# Patient Record
Sex: FEMALE | Race: BLACK OR AFRICAN AMERICAN | NOT HISPANIC OR LATINO | Employment: OTHER | URBAN - METROPOLITAN AREA
[De-identification: names, ages, dates, MRNs, and addresses within clinical notes are randomized per-mention and may not be internally consistent; named-entity substitution may affect disease eponyms.]

---

## 2023-06-17 LAB
CHLAMYDIA TRACH., AMPLIFIED: NEGATIVE
CLUE CELLS: ABNORMAL
N. GONORRHEA, AMPLIFIED: NEGATIVE
NUGENT SCORE: 4
TRICHOMONAS VAGINALIS: NEGATIVE
VAGINITIS-BV + YEAST INTERPRETATION: ABNORMAL
YEAST: ABNORMAL

## 2024-04-19 PROBLEM — N63.0 MASS OF BREAST: Status: ACTIVE | Noted: 2024-03-13

## 2024-04-19 PROBLEM — L81.0 POSTINFLAMMATORY HYPERPIGMENTATION: Status: ACTIVE | Noted: 2018-12-03

## 2024-04-19 PROBLEM — R11.0 NAUSEA: Status: ACTIVE | Noted: 2024-04-19

## 2024-04-19 PROBLEM — N89.8 VAGINAL DISCHARGE: Status: RESOLVED | Noted: 2024-04-19 | Resolved: 2024-04-19

## 2024-04-19 PROBLEM — R21 RASH: Status: ACTIVE | Noted: 2024-04-19

## 2024-04-19 PROBLEM — N76.0 BACTERIAL VAGINOSIS: Status: RESOLVED | Noted: 2023-01-06 | Resolved: 2024-04-19

## 2024-04-19 PROBLEM — F25.0 SCHIZOAFFECTIVE DISORDER, BIPOLAR TYPE (MULTI): Status: ACTIVE | Noted: 2021-06-02

## 2024-04-19 PROBLEM — L81.9 DISORDER OF PIGMENTATION, UNSPECIFIED: Status: ACTIVE | Noted: 2018-12-03

## 2024-04-19 PROBLEM — M15.9 OSTEOARTHRITIS OF MULTIPLE JOINTS: Status: ACTIVE | Noted: 2017-06-28

## 2024-04-19 PROBLEM — F17.200 SMOKER: Status: ACTIVE | Noted: 2017-07-18

## 2024-04-19 PROBLEM — M54.9 BACKACHE: Status: ACTIVE | Noted: 2024-04-19

## 2024-04-19 PROBLEM — M47.812 CERVICAL SPONDYLOSIS WITHOUT MYELOPATHY: Status: ACTIVE | Noted: 2019-11-27

## 2024-04-19 PROBLEM — M51.36 DDD (DEGENERATIVE DISC DISEASE), LUMBAR: Status: ACTIVE | Noted: 2017-10-02

## 2024-04-19 PROBLEM — M54.16 LUMBAR RADICULAR PAIN: Status: ACTIVE | Noted: 2024-04-19

## 2024-04-19 PROBLEM — M54.41 CHRONIC RIGHT-SIDED LOW BACK PAIN WITH RIGHT-SIDED SCIATICA: Status: ACTIVE | Noted: 2017-10-16

## 2024-04-19 PROBLEM — M51.369 DDD (DEGENERATIVE DISC DISEASE), LUMBAR: Status: ACTIVE | Noted: 2017-10-02

## 2024-04-19 PROBLEM — N94.6 MENSTRUAL CRAMP: Status: ACTIVE | Noted: 2024-04-19

## 2024-04-19 PROBLEM — B36.0 PITYRIASIS VERSICOLOR: Status: ACTIVE | Noted: 2018-12-03

## 2024-04-19 PROBLEM — Z86.69 HISTORY OF IMPACTED CERUMEN: Status: ACTIVE | Noted: 2024-04-19

## 2024-04-19 PROBLEM — M16.10 ARTHRITIS OF HIP: Status: ACTIVE | Noted: 2024-03-13

## 2024-04-19 PROBLEM — E55.9 VITAMIN D DEFICIENCY: Status: ACTIVE | Noted: 2021-09-13

## 2024-04-19 PROBLEM — I10 ESSENTIAL HYPERTENSION: Status: ACTIVE | Noted: 2018-12-14

## 2024-04-19 PROBLEM — M47.817 LUMBOSACRAL SPONDYLOSIS WITHOUT MYELOPATHY: Status: ACTIVE | Noted: 2019-11-27

## 2024-04-19 PROBLEM — F17.200 TOBACCO USE DISORDER: Status: ACTIVE | Noted: 2017-07-18

## 2024-04-19 PROBLEM — R63.0 APPETITE LOSS: Status: ACTIVE | Noted: 2024-04-19

## 2024-04-19 PROBLEM — J45.901 SEVERE ASTHMA WITH ACUTE EXACERBATION (HHS-HCC): Status: ACTIVE | Noted: 2024-04-19

## 2024-04-19 PROBLEM — K21.9 GASTROESOPHAGEAL REFLUX DISEASE: Status: ACTIVE | Noted: 2024-03-13

## 2024-04-19 PROBLEM — M79.2 NEURITIS: Status: ACTIVE | Noted: 2017-06-28

## 2024-04-19 PROBLEM — G89.29 CHRONIC RIGHT-SIDED LOW BACK PAIN WITH RIGHT-SIDED SCIATICA: Status: ACTIVE | Noted: 2017-10-16

## 2024-04-19 PROBLEM — F32.2 MAJOR DEPRESSIVE DISORDER, SINGLE EPISODE, SEVERE (MULTI): Chronic | Status: ACTIVE | Noted: 2020-12-18

## 2024-04-19 PROBLEM — M72.2 PLANTAR FASCIITIS, BILATERAL: Status: ACTIVE | Noted: 2017-06-28

## 2024-04-19 PROBLEM — M81.0 OSTEOPOROSIS: Status: ACTIVE | Noted: 2021-07-30

## 2024-04-19 PROBLEM — E78.5 HYPERLIPIDEMIA: Status: ACTIVE | Noted: 2023-12-04

## 2024-04-19 PROBLEM — J41.8 MIXED SIMPLE AND MUCOPURULENT CHRONIC BRONCHITIS (MULTI): Status: ACTIVE | Noted: 2017-11-22

## 2024-04-19 PROBLEM — Q78.2 OSTEOPETROSIS (HHS-HCC): Status: ACTIVE | Noted: 2024-03-13

## 2024-04-19 PROBLEM — R77.2: Status: ACTIVE | Noted: 2024-03-13

## 2024-04-19 PROBLEM — F43.10 PTSD (POST-TRAUMATIC STRESS DISORDER): Status: ACTIVE | Noted: 2023-01-06

## 2024-04-19 PROBLEM — B96.89 BACTERIAL VAGINOSIS: Status: RESOLVED | Noted: 2023-01-06 | Resolved: 2024-04-19

## 2024-04-19 PROBLEM — R06.00 DYSPNEA: Status: ACTIVE | Noted: 2024-04-19

## 2024-04-19 RX ORDER — ERGOCALCIFEROL 1.25 MG/1
1 CAPSULE ORAL
COMMUNITY
Start: 2020-01-28

## 2024-04-19 RX ORDER — ALBUTEROL SULFATE 90 UG/1
2 AEROSOL, METERED RESPIRATORY (INHALATION) EVERY 4 HOURS PRN
COMMUNITY
Start: 2023-12-04

## 2024-04-19 RX ORDER — TIOTROPIUM BROMIDE INHALATION SPRAY 1.56 UG/1
SPRAY, METERED RESPIRATORY (INHALATION)
COMMUNITY
Start: 2023-12-04

## 2024-04-19 RX ORDER — BUDESONIDE AND FORMOTEROL FUMARATE DIHYDRATE 80; 4.5 UG/1; UG/1
2 AEROSOL RESPIRATORY (INHALATION) 2 TIMES DAILY
COMMUNITY
Start: 2023-05-15

## 2024-04-19 RX ORDER — HYDROCHLOROTHIAZIDE 25 MG/1
25 TABLET ORAL
COMMUNITY
Start: 2023-12-04

## 2024-04-19 RX ORDER — DEXTROMETHORPHAN HYDROBROMIDE, GUAIFENESIN 5; 100 MG/5ML; MG/5ML
1300 LIQUID ORAL EVERY 8 HOURS PRN
COMMUNITY
Start: 2023-12-04

## 2024-04-19 RX ORDER — ATORVASTATIN CALCIUM 40 MG/1
40 TABLET, FILM COATED ORAL
COMMUNITY
Start: 2023-06-12

## 2024-04-19 RX ORDER — IBUPROFEN 200 MG
1 TABLET ORAL
COMMUNITY
Start: 2024-04-09

## 2024-04-19 RX ORDER — OLOPATADINE HYDROCHLORIDE 2 MG/ML
1 SOLUTION/ DROPS OPHTHALMIC
COMMUNITY
Start: 2023-04-10

## 2024-04-19 RX ORDER — CETIRIZINE HYDROCHLORIDE 10 MG/1
10 TABLET ORAL
COMMUNITY
Start: 2024-03-25 | End: 2024-06-23

## 2024-04-19 RX ORDER — NAPROXEN 500 MG/1
1 TABLET ORAL
COMMUNITY
Start: 2024-03-13

## 2024-04-19 RX ORDER — AMLODIPINE BESYLATE 10 MG/1
1 TABLET ORAL
COMMUNITY
Start: 2023-06-12 | End: 2024-07-17

## 2024-04-22 ENCOUNTER — OFFICE VISIT (OUTPATIENT)
Dept: ORTHOPEDIC SURGERY | Facility: CLINIC | Age: 64
End: 2024-04-22
Payer: MEDICAID

## 2024-04-22 ENCOUNTER — HOSPITAL ENCOUNTER (OUTPATIENT)
Dept: RADIOLOGY | Facility: CLINIC | Age: 64
Discharge: HOME | End: 2024-04-22
Payer: MEDICAID

## 2024-04-22 DIAGNOSIS — M25.539 WRIST PAIN, ACUTE, UNSPECIFIED LATERALITY: ICD-10-CM

## 2024-04-22 DIAGNOSIS — M25.551 BILATERAL HIP PAIN: ICD-10-CM

## 2024-04-22 DIAGNOSIS — M54.16 BILATERAL LUMBAR RADICULOPATHY: Primary | ICD-10-CM

## 2024-04-22 DIAGNOSIS — M25.552 BILATERAL HIP PAIN: ICD-10-CM

## 2024-04-22 PROCEDURE — 73521 X-RAY EXAM HIPS BI 2 VIEWS: CPT

## 2024-04-22 PROCEDURE — 99204 OFFICE O/P NEW MOD 45 MIN: CPT | Performed by: ORTHOPAEDIC SURGERY

## 2024-04-22 PROCEDURE — 73110 X-RAY EXAM OF WRIST: CPT | Mod: LT

## 2024-04-22 PROCEDURE — 73110 X-RAY EXAM OF WRIST: CPT | Mod: LEFT SIDE | Performed by: RADIOLOGY

## 2024-04-22 PROCEDURE — 73521 X-RAY EXAM HIPS BI 2 VIEWS: CPT | Mod: BILATERAL PROCEDURE | Performed by: RADIOLOGY

## 2024-04-22 PROCEDURE — 99214 OFFICE O/P EST MOD 30 MIN: CPT | Performed by: ORTHOPAEDIC SURGERY

## 2024-04-22 NOTE — PROGRESS NOTES
ORTHOPAEDIC HISTORY AND PHYSICAL    History Of Present Illness  Orthopaedic Problems/Injuries:  Allyson Mckeon is a 64 y.o. female presenting with lower back pain.  She is also complaining of left wrist pain.  Patient report that the pain is over the bilateral lower back and the pain radiated down her leg.  She denies weakness.  She reports tingling.  No pain in the groin.  Patient reported that she had MRI done at [facility] that showed arthritis and disc disease.  She also reported that she has history of left wrist fracture that was treated and she is having pain in the wrist.  She is able to ambulate independently.      Review of Systems: 12 point ROS negative unless stated in HPI    Past Medical History  She has a past medical history of Acute vaginitis (03/04/2021), Anorexia (01/13/2021), Dysmenorrhea, unspecified (01/27/2020), Encounter for gynecological examination (general) (routine) without abnormal findings (01/13/2021), Encounter for gynecological examination (general) (routine) without abnormal findings (01/28/2020), Essential (primary) hypertension (11/05/2018), Gastro-esophageal reflux disease without esophagitis (01/27/2020), Nausea (03/04/2021), Personal history of other diseases of the female genital tract (01/28/2020), Personal history of other diseases of the nervous system and sense organs (01/13/2021), Rash and other nonspecific skin eruption (01/13/2021), Unspecified asthma with (acute) exacerbation ([facility]), and Vitamin D deficiency, unspecified (01/28/2020).    Surgical History  She has a past surgical history that includes Other surgical history (01/14/2015); Foot surgery (01/14/2015); and CT angio coronary art with heartflow if score >30% (7/15/2020).     Social History  She reports that she has been smoking cigarettes. She does not have any smokeless tobacco history on file. She reports that she does not currently use alcohol. She reports that she does not currently use  drugs.    Family History  No family history on file.     Allergies  Codeine, Hydromorphone, Hydromorphone (bulk), Cyproheptadine, Ketorolac tromethamine, Phenytoin, Pregabalin, and Tramadol    Review of Systems     Physical Exam  Gen: The patient is alert and oriented ×3, is in no acute distress, and appear their stated age and weight.    Psychiatric: Mood and affect are appropriate.    Eyes: Sclera are white, and pupils are round and symmetric.    ENT: Mucous membranes are moist.     Neck: Supple. Thyroid is midline.    Respiratory: Respirations are nonlabored, chest rise is symmetric.    Cardiac: Rate is regular by palpation of distal pulses.     Abdomen: Nondistended.    Integument: No obvious cutaneous lesions are noted. No signs of lymphangitis. No signs of systemic edema.    Patient is able to ambulate independently.  Examination of the hip reveals no skin abnormalities.  No tenderness to palpation about the hip, knee and thigh area.  Range of motion of the hip reveals flexion past 90 degrees, patient has full extension, 40 degrees of hip abduction, 30 degrees of abduction, internal rotation to 20 degrees and external rotation to 45 degrees.  There is pain with rotational range of motion of the hip.    Patient has bilateral paraspinous muscle tenderness.  No midline tenderness.  Negative straight leg raise.  5 out of 5 strength in the lower extremity.    Relevant Results  I personally reviewed AP pelvis and bilateral hip radiograph.  Her hip radiographs are normal without degenerative changes.  There is degenerative changes of the lumbar spine.  Assessment/Plan   Patient presents today with bilateral lumbar radiculopathy.  No hip pathology.  No weakness in the lower extremity.  I recommend and referred her to our spine [place] for evaluation.  For her wrist pain I referred her to our hand specialist.  Patient also requested referral for pain management.  Referral to pain management clinic was given.  Patient  will take over-the-counter anti-inflammatories in the interim.

## 2025-08-11 ENCOUNTER — APPOINTMENT (OUTPATIENT)
Facility: HOSPITAL | Age: 65
End: 2025-08-11
Payer: MEDICAID

## 2025-08-18 ENCOUNTER — APPOINTMENT (OUTPATIENT)
Facility: HOSPITAL | Age: 65
End: 2025-08-18
Payer: MEDICAID